# Patient Record
Sex: FEMALE | Race: BLACK OR AFRICAN AMERICAN | NOT HISPANIC OR LATINO | URBAN - METROPOLITAN AREA
[De-identification: names, ages, dates, MRNs, and addresses within clinical notes are randomized per-mention and may not be internally consistent; named-entity substitution may affect disease eponyms.]

---
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
## 2022-01-24 ENCOUNTER — EMERGENCY (EMERGENCY)
Facility: HOSPITAL | Age: 22
LOS: 1 days | Discharge: HOME | End: 2022-01-24
Admitting: EMERGENCY MEDICINE
Payer: MEDICAID

## 2022-01-24 DIAGNOSIS — M25.571 PAIN IN RIGHT ANKLE AND JOINTS OF RIGHT FOOT: ICD-10-CM
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
## 2022-01-24 DIAGNOSIS — J45.909 UNSPECIFIED ASTHMA, UNCOMPLICATED: ICD-10-CM

## 2022-01-24 DIAGNOSIS — S09.8XXA OTHER SPECIFIED INJURIES OF HEAD, INITIAL ENCOUNTER: ICD-10-CM

## 2022-01-24 DIAGNOSIS — S93.491A SPRAIN OF OTHER LIGAMENT OF RIGHT ANKLE, INITIAL ENCOUNTER: ICD-10-CM
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
## 2022-01-24 PROCEDURE — 99284 EMERGENCY DEPT VISIT MOD MDM: CPT

## 2022-01-24 PROCEDURE — 73610 X-RAY EXAM OF ANKLE: CPT | Mod: 26,RT

## 2022-09-05 ENCOUNTER — NON-APPOINTMENT (OUTPATIENT)
Age: 22
End: 2022-09-05

## 2024-01-22 ENCOUNTER — NON-APPOINTMENT (OUTPATIENT)
Age: 24
End: 2024-01-22